# Patient Record
Sex: MALE | Race: WHITE
[De-identification: names, ages, dates, MRNs, and addresses within clinical notes are randomized per-mention and may not be internally consistent; named-entity substitution may affect disease eponyms.]

---

## 2019-03-06 ENCOUNTER — HOSPITAL ENCOUNTER (OUTPATIENT)
Dept: HOSPITAL 47 - RADCTMAIN | Age: 69
Discharge: HOME | End: 2019-03-06
Payer: OTHER GOVERNMENT

## 2019-03-06 DIAGNOSIS — J98.4: ICD-10-CM

## 2019-03-06 DIAGNOSIS — I71.2: Primary | ICD-10-CM

## 2019-03-06 DIAGNOSIS — R91.1: ICD-10-CM

## 2019-03-06 PROCEDURE — 71250 CT THORAX DX C-: CPT

## 2019-03-06 NOTE — CT
EXAMINATION TYPE: CT chest wo con

 

DATE OF EXAM: 3/6/2019

 

COMPARISON: Pulmonary nodule

 

HISTORY: Pulmonary nodule

 

CT DLP: 1162.1 mGycm, Automated exposure control for dose reduction was used.

 

CONTRAST: Performed injected with 0 mL of Isovue 300.

 

TECHNIQUE: Axial images were obtained at 5 mm thick sections.  Reconstructed images are reviewed on [employer] computer in the coronal plane. 

 

FINDINGS: Portion of the thyroid visualized is normal.

 

There is a 0.5 cm density within the periphery of the lingula. Series 4 image 45. Findings nonspecifi
c. Follow-up exam in 6 months is recommended. Emphysematous changes are at the lung apices.

 

No enlarged mediastinal or hilar adenopathy is evident.   The ascending aorta diameter at the level o
f the main pulmonary artery is 4.2 cm.  The main pulmonary artery diameter at the bifurcation is 3.2 
cm. Mild coronary artery calcifications present.

 

Limited CT sections are obtained through the upper abdomen. Abdomen is essentially unremarkable.

 

IMPRESSIONS:

1. Ascending thoracic aortic aneurysm measuring 4.2 cm AP dimension.

2. Small nonspecific density at the lingula. Recommend follow-up CT examination 6 months.